# Patient Record
Sex: MALE | Race: WHITE | NOT HISPANIC OR LATINO | Employment: FULL TIME | URBAN - METROPOLITAN AREA
[De-identification: names, ages, dates, MRNs, and addresses within clinical notes are randomized per-mention and may not be internally consistent; named-entity substitution may affect disease eponyms.]

---

## 2019-07-10 ENCOUNTER — TRANSFERRED RECORDS (OUTPATIENT)
Dept: HEALTH INFORMATION MANAGEMENT | Facility: CLINIC | Age: 39
End: 2019-07-10

## 2020-05-28 ENCOUNTER — OFFICE VISIT (OUTPATIENT)
Dept: FAMILY MEDICINE | Facility: CLINIC | Age: 40
End: 2020-05-28
Payer: COMMERCIAL

## 2020-05-28 ENCOUNTER — NURSE TRIAGE (OUTPATIENT)
Dept: FAMILY MEDICINE | Facility: CLINIC | Age: 40
End: 2020-05-28

## 2020-05-28 VITALS
DIASTOLIC BLOOD PRESSURE: 70 MMHG | TEMPERATURE: 98.3 F | HEIGHT: 74 IN | HEART RATE: 94 BPM | WEIGHT: 204 LBS | SYSTOLIC BLOOD PRESSURE: 138 MMHG | BODY MASS INDEX: 26.18 KG/M2 | OXYGEN SATURATION: 98 %

## 2020-05-28 DIAGNOSIS — R10.9 ABDOMINAL DISCOMFORT: Primary | ICD-10-CM

## 2020-05-28 DIAGNOSIS — K92.89 GAS BLOAT SYNDROME: ICD-10-CM

## 2020-05-28 LAB
ALBUMIN UR-MCNC: NEGATIVE MG/DL
APPEARANCE UR: CLEAR
BILIRUB UR QL STRIP: NEGATIVE
COLOR UR AUTO: YELLOW
ERYTHROCYTE [DISTWIDTH] IN BLOOD BY AUTOMATED COUNT: 12.6 % (ref 10–15)
GLUCOSE UR STRIP-MCNC: NEGATIVE MG/DL
HCT VFR BLD AUTO: 46.2 % (ref 40–53)
HGB BLD-MCNC: 15.8 G/DL (ref 13.3–17.7)
HGB UR QL STRIP: ABNORMAL
KETONES UR STRIP-MCNC: 40 MG/DL
LEUKOCYTE ESTERASE UR QL STRIP: NEGATIVE
MCH RBC QN AUTO: 28.9 PG (ref 26.5–33)
MCHC RBC AUTO-ENTMCNC: 34.2 G/DL (ref 31.5–36.5)
MCV RBC AUTO: 85 FL (ref 78–100)
NITRATE UR QL: NEGATIVE
PH UR STRIP: 6.5 PH (ref 5–7)
PLATELET # BLD AUTO: 227 10E9/L (ref 150–450)
RBC # BLD AUTO: 5.47 10E12/L (ref 4.4–5.9)
RBC #/AREA URNS AUTO: ABNORMAL /HPF
SOURCE: ABNORMAL
SP GR UR STRIP: 1.02 (ref 1–1.03)
UROBILINOGEN UR STRIP-ACNC: 0.2 EU/DL (ref 0.2–1)
WBC # BLD AUTO: 7.9 10E9/L (ref 4–11)
WBC #/AREA URNS AUTO: ABNORMAL /HPF

## 2020-05-28 PROCEDURE — 36415 COLL VENOUS BLD VENIPUNCTURE: CPT | Performed by: FAMILY MEDICINE

## 2020-05-28 PROCEDURE — 99203 OFFICE O/P NEW LOW 30 MIN: CPT | Performed by: FAMILY MEDICINE

## 2020-05-28 PROCEDURE — 81001 URINALYSIS AUTO W/SCOPE: CPT | Performed by: FAMILY MEDICINE

## 2020-05-28 PROCEDURE — 80053 COMPREHEN METABOLIC PANEL: CPT | Performed by: FAMILY MEDICINE

## 2020-05-28 PROCEDURE — 85027 COMPLETE CBC AUTOMATED: CPT | Performed by: FAMILY MEDICINE

## 2020-05-28 ASSESSMENT — MIFFLIN-ST. JEOR: SCORE: 1900.09

## 2020-05-28 NOTE — PROGRESS NOTES
Subjective     Jitendra Flannery is a 39 year old male who presents to clinic today for the following health issues:    HPI   ABDOMINAL   PAIN     Onset: x 2-3 weeks , OFF AND ON , comes and goes not much today     Description:   Character: Sharp, Dull ache and Cramping  Location: left upper quadrant left lower quadrant  Radiation: sometimes goes to upper mid abdomen     Intensity: mild    Progression of Symptoms:  same and intermittent, feeling better today not much pain today     Accompanying Signs & Symptoms:  Fever/Chills?: no   Gas/Bloating: YES, indigestion feeling , more gassy   Eating same. But has moved to [place] recently so has been eating less vegetable   He bikes regualry could do better with diet and may eed to reduce fat in diet  No associated chest pain, sob dizziness etc.    Nausea: no   Vomitting: no   Diarrhea?: no   Constipation:no , usual no change in stool may be somewhat soft , but no blood in stool   Dysuria or Hematuria: no frequent urination , normal urine   Dry mouth lately      History:   Trauma: no   Previous similar pain: yes  Previous tests done: x-ray    Precipitating factors:   Does the pain change with:     Food: YES -worse    BM: YES- better     Urination: n/a    Alleviating factors:  Has lot of gas, Farting and bm makes it better     Therapies Tried and outcome: none    LMP:  not applicable             Reviewed and updated as needed this visit by Provider         Review of Systems   Constitutional, HEENT, cardiovascular, pulmonary, GI, , musculoskeletal, neuro, skin, endocrine and psych systems are negative, except as otherwise noted.      Objective    There were no vitals taken for this visit.  There is no height or weight on file to calculate BMI.  Physical Exam   GENERAL: healthy, alert and no distress  EYES: Eyes grossly normal to inspection,  conjunctivae and sclerae normal  HENT: oropharynx clear and oral mucous membranes moist  NECK: no adenopathy, no asymmetry, masses, or scars  and thyroid normal to palpation  RESP: lungs clear to auscultation - no rales, rhonchi or wheezes  CV: regular rate and rhythm, normal S1 S2, no S3 or S4, no murmur, click or rub, no peripheral edema and peripheral pulses strong  ABDOMEN: soft, nontender, no hepatosplenomegaly, no masses and bowel sounds normal  MS: no edema  SKIN: no suspicious lesions or rashes  NEURO: Normal strength and tone, mentation intact and speech normal  PSYCH: mentation appears normal, affect normal/bright    Diagnostic Test Results:  Labs reviewed in Epic  CBC - normal   Urinalysis - unremarkable        Assessment & Plan     (R10.9) Abdominal discomfort  (primary encounter diagnosis)  Comment: pain is better today but still has gas mostly   Plan: CBC with platelets, Comprehensive metabolic         panel, *UA reflex to Microscopic and Culture         ([facility] (except [place]        and [place]), Urine Microscopic            (K92.89) Gas bloat syndrome  Comment:   Plan:      Discussed possible differential diagnosis for his  Symptoms.  cbc normal. Awaiting other labs   clinically he is better and comfortable watching .   He admits to lot of gas and we talked about diet/ fibre and probiotic to regulate bm and avoid gas producing food etc to see if helps   Talked about warning s/s for which should be seen urgently     Awaiting other labs   .Cares and  treatment discussed. follow up if problem   Patient expressed understanding and agreement with treatment plan. All patient's questions were answered, will let me know if has more later.  Medications: Rx's: Reviewed the potential side effects/complications of medications prescribed.             Sola Flynn MD  [facility]

## 2020-05-28 NOTE — PATIENT INSTRUCTIONS
Patient Education     Abdominal Pain    Abdominal pain is pain in the stomach or belly area. Everyone has this pain from time to time. In many cases it goes away on its own. But abdominal pain can sometimes be due to a serious problem, such as appendicitis. So it s important to know when to get help.  Causes of abdominal pain  There are many possible causes of abdominal pain. Common causes in adults include:    Constipation, diarrhea, or gas    Stomach acid flowing back up into the esophagus (acid reflux or heartburn)    Severe acid reflux, called GERD (gastroesophageal reflux disease)    A sore in the lining of the stomach or small intestine (peptic ulcer)    Inflammation of the gallbladder, liver, or pancreas    Gallstones or kidney stones    Appendicitis     Intestinal blockage     An internal organ pushing through a muscle or other tissue (hernia)    Urinary tract infections    In women, menstrual cramps, fibroids, ovarian cysts, pelvic inflammatory disease, or endometriosis    Inflammation or infection of the intestines, including Crohn's disease and ulcerative colitis    Irritable bowel syndrome  Diagnosing the cause of abdominal pain  Your healthcare provider will give you a physical exam help find the cause of your pain. If needed, you will have tests. Belly pain has many possible causes. So it can be hard to find the reason for your pain. Giving details about your pain can help. Tell your provider where and when you feel the pain, and what makes it better or worse. Also let your provider know if you have other symptoms such as:    Fever    Tiredness    Upset stomach (nausea)    Vomiting    Changes in bathroom habits    Blood in the stool or black, tarry stool    Weight loss that you can't explain (involuntary weight loss?)  Also report any family history of stomach or intestinal problems, or cancers. Tell your provider about all your alcohol use and drug use. Tell your provider about all medicines you  use, including herbs, vitamins, and supplements.   Treating abdominal pain  Some causes of pain need emergency medical treatment right away. These include appendicitis or a bowel blockage. Other problems can be treated with rest, fluids, or medicines. Your healthcare provider can give you specific instructions for treatment or self-care based on what is causing your pain.     If you have vomiting or diarrhea, sip water or other clear fluids. When you are ready to eat solid foods again, start with small amounts of easy-to-digest, low-fat foods. These include apple sauce, toast, or crackers.  When to get medical care  Call 911 or go to the hospital right away if you:    Can t pass stool and are vomiting    Are vomiting blood or have bloody diarrhea or black, tarry diarrhea    Have chest, neck, or shoulder pain    Feel like you might pass out    Have pain in your shoulder blades with nausea    Have sudden, severe belly pain    Have new, severe pain unlike any you have felt before    Have a belly that is rigid, hard, and hurts to touch  Call your healthcare provider if you have:    Pain for more than 5 days    Bloating for more than 2 days    Diarrhea for more than 5 days    A fever of 100.4 F (38 C) or higher, or as directed by your healthcare provider    Pain that gets worse    Weight loss for no reason    Continued lack of appetite    Blood in your stool  How to prevent abdominal pain  Here are some tips to help prevent abdominal pain:    Eat smaller amounts of food at each meal.    Don't eat greasy, fried, or other high-fat foods.    Don't eat foods that give you gas.    Exercise regularly.    Drink plenty of fluids.  To help prevent GERD symptoms:    Quit smoking.    Reduce alcohol and foods that increase stomach acid.    Don't use aspirin or over-the-counter pain and fever medicines, if possible. This includes nonsteroidal anti-inflammatory drugs (NSAIDs).    Lose excess weight.    Finish eating at least 2 hours  before you go to bed or lie down.    Raise the head of your bed.  Date Last Reviewed: 7/1/2016 2000-2019 [employer]. [address] [place] [address], [place] [address]. All rights reserved. This information is not intended as a substitute for professional medical care. Always follow your healthcare professional's instructions.

## 2020-05-28 NOTE — TELEPHONE ENCOUNTER
"    Additional Information    Negative: Passed out (i.e., fainted, collapsed and was not responding)    Negative: Shock suspected (e.g., cold/pale/clammy skin, too weak to stand, low BP, rapid pulse)    Negative: Sounds like a life-threatening emergency to the triager    Negative: Chest pain    Negative: Pain is mainly in upper abdomen (if needed ask: 'is it mainly above the belly button?')    Negative: SEVERE abdominal pain (e.g., excruciating)    Negative: Vomiting red blood or black (coffee ground) material    Negative: Bloody, black, or tarry bowel movements    Negative: Unable to urinate (or only a few drops) and bladder feels very full    Negative: Pain in scrotum persists > 1 hour    Negative: Constant abdominal pain lasting > 2 hours    Negative: Vomiting bile (green color)    Negative: Patient sounds very sick or weak to the triager    Negative: Vomiting and abdomen looks much more swollen than usual    Negative: White of the eyes have turned yellow (i.e., jaundice)    Negative: Blood in urine (red, pink, or tea-colored)    Negative: Fever > 103 F (39.4 C)    Negative: Fever > 101 F (38.3 C) and over 60 years of age    Negative: Fever > 100.0 F (37.8 C) and has diabetes mellitus or a weak immune system (e.g., HIV positive, cancer chemotherapy, organ transplant, splenectomy, chronic steroids)    Negative: Fever > 100.0 F (37.8 C) and bedridden (e.g., nursing home patient, stroke, chronic illness, recovering from surgery)    Negative: Age > 60 years    Negative: Patient wants to be seen    Mild pain that comes and goes (cramps) lasts > 24 hours    Answer Assessment - Initial Assessment Questions  1. LOCATION: \"Where does it hurt?\"       Lower left abdomen  2. RADIATION: \"Does the pain shoot anywhere else?\" (e.g., chest, back)      Travels down the left side to the right under the belly button  3. ONSET: \"When did the pain begin?\" (Minutes, hours or days ago)       Couple weeks ago  4. SUDDEN: \"Gradual or " "sudden onset?\"      On and off for a couple of weeks  5. PATTERN \"Does the pain come and go, or is it constant?\"     - If constant: \"Is it getting better, staying the same, or worsening?\"       (Note: Constant means the pain never goes away completely; most serious pain is constant and it progresses)      - If intermittent: \"How long does it last?\" \"Do you have pain now?\"      (Note: Intermittent means the pain goes away completely between bouts)      Comes and goes  6. SEVERITY: \"How bad is the pain?\"  (e.g., Scale 1-10; mild, moderate, or severe)     - MILD (1-3): doesn't interfere with normal activities, abdomen soft and not tender to touch      - MODERATE (4-7): interferes with normal activities or awakens from sleep, tender to touch      - SEVERE (8-10): excruciating pain, doubled over, unable to do any normal activities        2-3/10  7. RECURRENT SYMPTOM: \"Have you ever had this type of abdominal pain before?\" If so, ask: \"When was the last time?\" and \"What happened that time?\"       Yes,  A couple of years ago and had more stool in colon  8. CAUSE: \"What do you think is causing the abdominal pain?\"      Gas  9. RELIEVING/AGGRAVATING FACTORS: \"What makes it better or worse?\" (e.g., movement, antacids, bowel movement)      Burping and passing gas makes it better.    10. OTHER SYMPTOMS: \"Has there been any vomiting, diarrhea, constipation, or urine problems?\"        no    Protocols used: ABDOMINAL PAIN - MALE-A-OH    SEE TODAY IN OFFICE:   * You need to be examined today. Let me give you an appointment.  * IF NO AVAILABLE APPOINTMENTS: You need to be seen in the Urgent Care Center.  Go there today. A nearby Urgent Care Center is often a good source of care. Another choice is to go to the ER.      CALL BACK IF:  * Abdominal pain is constant and present for more than 2 hours  * Abdominal pains come and go and are present for more than 24 hours  * You become worse      Patient/Caregiver understands and will follow " care advice? Yes, plans to follow advice        Transferred to  to schedule and finish with registration    Tabby FUCHS RN  EP Triage

## 2020-05-29 LAB
ALBUMIN SERPL-MCNC: 4.4 G/DL (ref 3.4–5)
ALP SERPL-CCNC: 53 U/L (ref 40–150)
ALT SERPL W P-5'-P-CCNC: 25 U/L (ref 0–70)
ANION GAP SERPL CALCULATED.3IONS-SCNC: 6 MMOL/L (ref 3–14)
AST SERPL W P-5'-P-CCNC: 25 U/L (ref 0–45)
BILIRUB SERPL-MCNC: 0.7 MG/DL (ref 0.2–1.3)
BUN SERPL-MCNC: 11 MG/DL (ref 7–30)
CALCIUM SERPL-MCNC: 9.5 MG/DL (ref 8.5–10.1)
CHLORIDE SERPL-SCNC: 107 MMOL/L (ref 94–109)
CO2 SERPL-SCNC: 25 MMOL/L (ref 20–32)
CREAT SERPL-MCNC: 0.99 MG/DL (ref 0.66–1.25)
GFR SERPL CREATININE-BSD FRML MDRD: >90 ML/MIN/{1.73_M2}
GLUCOSE SERPL-MCNC: 96 MG/DL (ref 70–99)
POTASSIUM SERPL-SCNC: 4 MMOL/L (ref 3.4–5.3)
PROT SERPL-MCNC: 8 G/DL (ref 6.8–8.8)
SODIUM SERPL-SCNC: 138 MMOL/L (ref 133–144)

## 2021-06-25 NOTE — TELEPHONE ENCOUNTER
Action    Action Taken 6-25: requested records from [facility] in [place] Fax# [phone]  8-12: sent second request to [facility]

## 2021-08-27 ENCOUNTER — LAB (OUTPATIENT)
Dept: LAB | Facility: CLINIC | Age: 41
End: 2021-08-27
Payer: COMMERCIAL

## 2021-08-27 ENCOUNTER — OFFICE VISIT (OUTPATIENT)
Dept: CARDIOLOGY | Facility: CLINIC | Age: 41
End: 2021-08-27
Attending: INTERNAL MEDICINE
Payer: COMMERCIAL

## 2021-08-27 ENCOUNTER — PRE VISIT (OUTPATIENT)
Dept: CARDIOLOGY | Facility: CLINIC | Age: 41
End: 2021-08-27

## 2021-08-27 VITALS
HEART RATE: 62 BPM | BODY MASS INDEX: 27.34 KG/M2 | WEIGHT: 213 LBS | HEIGHT: 74 IN | DIASTOLIC BLOOD PRESSURE: 79 MMHG | OXYGEN SATURATION: 98 % | SYSTOLIC BLOOD PRESSURE: 143 MMHG

## 2021-08-27 DIAGNOSIS — Z82.49 FAMILY HISTORY OF HEART ATTACK: ICD-10-CM

## 2021-08-27 DIAGNOSIS — Z82.49 FAMILY HISTORY OF PREMATURE CAD: ICD-10-CM

## 2021-08-27 DIAGNOSIS — R00.2 PALPITATIONS: ICD-10-CM

## 2021-08-27 DIAGNOSIS — Z82.49 FAMILY HISTORY OF PREMATURE CAD: Primary | ICD-10-CM

## 2021-08-27 LAB
ALBUMIN SERPL-MCNC: 4.3 G/DL (ref 3.4–5)
ALP SERPL-CCNC: 55 U/L (ref 40–150)
ALT SERPL W P-5'-P-CCNC: 23 U/L (ref 0–70)
ANION GAP SERPL CALCULATED.3IONS-SCNC: 3 MMOL/L (ref 3–14)
AST SERPL W P-5'-P-CCNC: 23 U/L (ref 0–45)
BILIRUB SERPL-MCNC: 0.5 MG/DL (ref 0.2–1.3)
BUN SERPL-MCNC: 9 MG/DL (ref 7–30)
CALCIUM SERPL-MCNC: 9.7 MG/DL (ref 8.5–10.1)
CHLORIDE BLD-SCNC: 107 MMOL/L (ref 94–109)
CHOLEST SERPL-MCNC: 198 MG/DL
CO2 SERPL-SCNC: 30 MMOL/L (ref 20–32)
CREAT SERPL-MCNC: 1.12 MG/DL (ref 0.66–1.25)
ERYTHROCYTE [DISTWIDTH] IN BLOOD BY AUTOMATED COUNT: 12.1 % (ref 10–15)
FASTING STATUS PATIENT QL REPORTED: ABNORMAL
GFR SERPL CREATININE-BSD FRML MDRD: 81 ML/MIN/1.73M2
GLUCOSE BLD-MCNC: 92 MG/DL (ref 70–99)
HBA1C MFR BLD: 5.2 % (ref 0–5.6)
HCT VFR BLD AUTO: 46.9 % (ref 40–53)
HDLC SERPL-MCNC: 54 MG/DL
HGB BLD-MCNC: 15.5 G/DL (ref 13.3–17.7)
LDLC SERPL CALC-MCNC: 126 MG/DL
MCH RBC QN AUTO: 28.5 PG (ref 26.5–33)
MCHC RBC AUTO-ENTMCNC: 33 G/DL (ref 31.5–36.5)
MCV RBC AUTO: 86 FL (ref 78–100)
NONHDLC SERPL-MCNC: 144 MG/DL
PLATELET # BLD AUTO: 235 10E3/UL (ref 150–450)
POTASSIUM BLD-SCNC: 4.3 MMOL/L (ref 3.4–5.3)
PROT SERPL-MCNC: 7.6 G/DL (ref 6.8–8.8)
RBC # BLD AUTO: 5.43 10E6/UL (ref 4.4–5.9)
SODIUM SERPL-SCNC: 140 MMOL/L (ref 133–144)
TRIGL SERPL-MCNC: 88 MG/DL
WBC # BLD AUTO: 5.2 10E3/UL (ref 4–11)

## 2021-08-27 PROCEDURE — 99205 OFFICE O/P NEW HI 60 MIN: CPT | Mod: GC | Performed by: INTERNAL MEDICINE

## 2021-08-27 PROCEDURE — 83036 HEMOGLOBIN GLYCOSYLATED A1C: CPT | Performed by: PATHOLOGY

## 2021-08-27 PROCEDURE — 85027 COMPLETE CBC AUTOMATED: CPT | Performed by: PATHOLOGY

## 2021-08-27 PROCEDURE — G0463 HOSPITAL OUTPT CLINIC VISIT: HCPCS | Mod: 25

## 2021-08-27 PROCEDURE — 83695 ASSAY OF LIPOPROTEIN(A): CPT | Mod: 90 | Performed by: PATHOLOGY

## 2021-08-27 PROCEDURE — 80053 COMPREHEN METABOLIC PANEL: CPT | Performed by: PATHOLOGY

## 2021-08-27 PROCEDURE — 93005 ELECTROCARDIOGRAM TRACING: CPT

## 2021-08-27 PROCEDURE — 80061 LIPID PANEL: CPT | Performed by: PATHOLOGY

## 2021-08-27 PROCEDURE — 36415 COLL VENOUS BLD VENIPUNCTURE: CPT | Performed by: PATHOLOGY

## 2021-08-27 ASSESSMENT — MIFFLIN-ST. JEOR: SCORE: 1935.91

## 2021-08-27 ASSESSMENT — PAIN SCALES - GENERAL: PAINLEVEL: NO PAIN (0)

## 2021-08-27 NOTE — NURSING NOTE
Chief Complaint   Patient presents with     New Patient     New patient      Vitals were taken and medications were reconciled. EKG was performed   KENJI Camejo  10:19 AM

## 2021-08-27 NOTE — LETTER
8/27/2021      RE: Jitendra Flannery  [address] [address]  [place] [address]       Dear Colleague,    Thank you for the opportunity to participate in the care of your patient, Jitendra Flannery, at the [facility] [place] [facility]. Please see a copy of my visit note below.    CARDIOLOGY Prevention Note    HPI:  Jitendra Flannery is a 40 year old male who receives primary care from Sola Flynn MD. Jitendra was self referred to Cardiology for discussion of risk factor modification in the setting of significant family history.     Jitendra is a pleasant male with no significant past medical history. CVD risk factors include (-) HTN, (-) Smoking, (+) family history , (-) DM.     Briefly Jitendra is a 39 YO  at the [employer].  He is very active with cycling, [employer].  He presents today in the setting of discussion of cardiovascular risk modification and assessment following his brother's unexpected death earlier this year.    Patient reports that his brother passed away roughly in January after a cardiac arrest at home at the age of 50.  An autopsy was performed and showed significant coronary artery disease along with some cardiomegaly. His brother was previously healthy and quite active. Per Stewart he also reports his brother had developed atrial fibrillation prior and had a evaluation at [facility] in [place]. Other family history includes a grandfather who had an MI at age 65. His mother had a diagnosis of pulmonary hypertension in her 60s or 70s of unclear etiology.    Jitendra is without any significant symptoms today.  He has no shortness of breath or chest pain.  He has occasional skipped heartbeats which he says have been chronic.  He previously underwent a preliminary evaluation in college when he had chest pain and some skipped beats including EKG.  He has no chest pain with exertion although he does  notice some heaviness with extreme exertion.  He has not fainted or passed out.  He has never had palpitations with exercise.    Diet: Vegetable with lean meats, no take out or processed food.  No significant alcohol usage. No sugary beverages  Physical activity: Biking, cycling, cross fit    EKG in clinic shows sinus bradycardia with Q waves in V1 and possibly V2    ASSESSMENT AND PLAN  Jitendra Flannery is a 40 year old male presenting for discussion regarding cardiovascular risk assessment and risk modification. He overall is asymptomatic from a CV standpoint. EKG in clinic shows possible septal infarct although the QRS in V2 has a very slightly positive initial deflection. He has no concerning ischemic findings or evidence of rhythm disturbance. In the setting of his significant family hx we will proceed with an evaluation to rule out any structural abnormalities, obtain a CAC score to assess for coronary calcium and obtain blood work for HLD.      We discussed with Stewart the importance of continued exercise and healthy lifestyle choices including avoiding processed food and high sodium intake. Stewart is interested in possible referral to [facility].    Recommendations  1. CBC, CMP, Lipid panel, Lipoprotein A  2. TTE, CAC score  3. Further determination on continued diagnostic eval for coronary disease or other CVD pending initial testing. Will discuss results with Stewart.  4. Close monitoring for any palpitations or rhythm changes. If this occurs will perform Zio monitoring   5. Provided information for the [facility]  6. Follow-up in 3 months to discuss test results    Lab results from 8/27/21 show mildly elevated LDL  Normal results include Lpa, a1c, cmp and cbc    Thank you for the opportunity to participate in the care of Jitendra. Please feel free to contact me with any additional questions or concerns.    This patient was discussed with Dr. John Jiang who agrees with the plan    Flo Neely,  "MD  Cardiology Fellow PGY 5    Kartik Jiang MD    Preventive Cardiology  Pager: [phone]    Attending: Patient seen and examined with Dr. Gomez. The history and physical findings are accurate as recorded. My additional findings, if any, have been incorporated into the body of the note. All labs, imaging studies and ECG data have been reviewed personally. The assessment and plan outlined reflect our joint decision making.     The total time of this encounter amounted to 64 minutes. This time included time spent with the patient, reviewing records, ordering tests, and performing post visit documentation.     PAST MEDICAL HISTORY:  There is no problem list on file for this patient.    Past Medical History:   Diagnosis Date     NO ACTIVE PROBLEMS        CURRENT MEDICATIONS:  No current outpatient medications on file.       PAST SURGICAL HISTORY:  Past Surgical History:   Procedure Laterality Date     KNEE SURGERY      rt knee x for ACL REPAIER  ,         ALLERGIES  Patient has no known allergies.    FAMILY HX:  Family History   Problem Relation Age of Onset     Cirrhosis Mother          AT AGE 73, FROM COMPLICATION      Pulmonary Hypertension Mother      Coronary Artery Disease Maternal Grandfather 65     Myocardial Infarction Maternal Grandfather 65     Diabetes No family hx of      Cerebrovascular Disease No family hx of        SOCIAL HX:  Social History     Tobacco Use     Smoking status: Never Smoker     Smokeless tobacco: Never Used   Substance Use Topics     Alcohol use: Yes     Drug use: Never        ROS:  Comprehensive ROS is negative except as noted in HPI.    VITAL SIGNS:  BP (!) 143/79 (BP Location: Left arm, Patient Position: Sitting, Cuff Size: Adult Regular)   Pulse 62   Ht 1.88 m (6' 2\")   Wt 96.6 kg (213 lb)   SpO2 98%   BMI 27.35 kg/m    Body mass index is 27.35 kg/m .  Wt Readings from Last 2 Encounters:   21 96.6 kg (213 lb)   20 92.5 kg (204 " lb)       PHYSICAL EXAM  Gen: pleasant male sitting comfortably in NAD  Head: nc/at  Eyes: EOMI,   Neck: supple, no lymphadenopathy  CV: nml s1/s2, no murmur or gallop; no JVD  Chest: clear lung sounds bilaterally   Abd: Non distended   Ext: warm, no LE edema  Skin: no rash on limited exam  Neuro: awake, alert, oriented, nml speech and gait  Vasc: 2+ bilateral carotid, radial     LABS: personally reviewed  CMP  Recent Labs   Lab Test 08/27/21  1232 05/28/20  1435    138   POTASSIUM 4.3 4.0   CHLORIDE 107 107   CO2 30 25   ANIONGAP 3 6   GLC 92 96   BUN 9 11   CR 1.12 0.99   GFRESTIMATED 81 >90   GFRESTBLACK  --  >90   JONI 9.7 9.5   PROTTOTAL 7.6 8.0   ALBUMIN 4.3 4.4   BILITOTAL 0.5 0.7   ALKPHOS 55 53   AST 23 25   ALT 23 25     CBC  Recent Labs   Lab Test 08/27/21  1232 05/28/20  1435   WBC 5.2 7.9   RBC 5.43 5.47   HGB 15.5 15.8   HCT 46.9 46.2   MCV 86 85   MCH 28.5 28.9   MCHC 33.0 34.2   RDW 12.1 12.6    227     INRNo lab results found.  Recent Labs   Lab Test 08/27/21  1232   CHOL 198   HDL 54   *   TRIG 88     Recent Labs   Lab Test 08/27/21  1232   A1C 5.2       Most recent EKG: reviewed and discussed with shruti : Sinus bradycardia with 1st degree AV block. Q wave in V1    Most recent ECHO: N/A     Most recent STRESS TEST:  N/A     Most recent CARDIAC CATH:  N/A     Other Imaging: N/A       Please do not hesitate to contact me if you have any questions/concerns.     Sincerely,     Kartik Jiang MD

## 2021-08-27 NOTE — PATIENT INSTRUCTIONS
"You were seen today in the Cardiovascular Clinic at the [facility].     Cardiology Providers you saw during your visit: Dr. John Jiang     Diagnosis:   Encounter Diagnoses   Name Primary?     Family history of heart attack      Family history of premature CAD Yes     Palpitations           Orders:   Orders Placed This Encounter   Procedures     Lipid panel reflex to direct LDL Fasting     Hemoglobin A1c     Comprehensive metabolic panel     Lipoprotein (a)     CBC with platelets     [facility] CARDIOLOGY REFERRAL     Follow-Up with Cardiologist     EKG 12-lead, tracing only (Same Day)     Echocardiogram Complete       Current Medication List  No current outpatient medications on file.         Medications Discontinued:  There are no discontinued medications.      Recommendations:   1. Get your labs checked when you are able.  2. Schedule an echocardiogram (heart ultrasound) whenever it is convenient.   3. Schedule a coronary artery calcium scan whenever it is convenient.  4. Look into the [facility] for further evaluation for signs of premature cardiovascular disease.  5. Follow up with Dr. John Jiang in 3 months or after all the testing is complete.        Please feel free to call me with any questions or concerns.       Amrit Heredia LPN     Questions: [phone].   First press #1 for the [employer] and then press #4 for \"Medical Questions\" to reach us Cardiology Nurses.     [phone].   First press #1 for the [employer] and then press #1     On Call Cardiologist for after hours or on weekends: [phone]   option #4 and ask to speak to the on-call Cardiologist.          If you need a medication refill please contact your pharmacy.  Please allow 3 business days for your refill to be completed.  ________________________________________________________________________________________________________________________________    "

## 2021-08-27 NOTE — PROGRESS NOTES
CARDIOLOGY Prevention Note    HPI:  Jitendra Flannery is a 40 year old male who receives primary care from Sola Flynn MD. Jitendra was self referred to Cardiology for discussion of risk factor modification in the setting of significant family history.     Jitendra is a pleasant male with no significant past medical history. CVD risk factors include (-) HTN, (-) Smoking, (+) family history , (-) DM.     Briefly Jitendra is a 41 YO  at the [employer].  He is very active with cycling, [employer].  He presents today in the setting of discussion of cardiovascular risk modification and assessment following his brother's unexpected death earlier this year.    Patient reports that his brother passed away roughly in January after a cardiac arrest at home at the age of 50.  An autopsy was performed and showed significant coronary artery disease along with some cardiomegaly. His brother was previously healthy and quite active. Per Stewart he also reports his brother had developed atrial fibrillation prior and had a evaluation at [facility] in [place]. Other family history includes a grandfather who had an MI at age 65. His mother had a diagnosis of pulmonary hypertension in her 60s or 70s of unclear etiology.    Jitendra is without any significant symptoms today.  He has no shortness of breath or chest pain.  He has occasional skipped heartbeats which he says have been chronic.  He previously underwent a preliminary evaluation in college when he had chest pain and some skipped beats including EKG.  He has no chest pain with exertion although he does notice some heaviness with extreme exertion.  He has not fainted or passed out.  He has never had palpitations with exercise.    Diet: Vegetable with lean meats, no take out or processed food.  No significant alcohol usage. No sugary beverages  Physical activity: Biking, cycling, cross fit    EKG in clinic shows sinus bradycardia with Q waves in  V1 and possibly V2    ASSESSMENT AND PLAN  Jitendra Flannery is a 40 year old male presenting for discussion regarding cardiovascular risk assessment and risk modification. He overall is asymptomatic from a CV standpoint. EKG in clinic shows possible septal infarct although the QRS in V2 has a very slightly positive initial deflection. He has no concerning ischemic findings or evidence of rhythm disturbance. In the setting of his significant family hx we will proceed with an evaluation to rule out any structural abnormalities, obtain a CAC score to assess for coronary calcium and obtain blood work for HLD.      We discussed with Stewart the importance of continued exercise and healthy lifestyle choices including avoiding processed food and high sodium intake. Stewart is interested in possible referral to [facility].    Recommendations  1. CBC, CMP, Lipid panel, Lipoprotein A  2. TTE, CAC score  3. Further determination on continued diagnostic eval for coronary disease or other CVD pending initial testing. Will discuss results with Stewart.  4. Close monitoring for any palpitations or rhythm changes. If this occurs will perform Zio monitoring   5. Provided information for the [facility]  6. Follow-up in 3 months to discuss test results    Lab results from 8/27/21 show mildly elevated LDL  Normal results include Lpa, a1c, cmp and cbc    Thank you for the opportunity to participate in the care of Jitendra. Please feel free to contact me with any additional questions or concerns.    This patient was discussed with Dr. John Jiang who agrees with the plan    Flo Neely MD  Cardiology Fellow PGY 5    Kartik Jiang MD    Preventive Cardiology  Pager: [phone]    Attending: Patient seen and examined with Dr. Gomez. The history and physical findings are accurate as recorded. My additional findings, if any, have been incorporated into the body of the note. All labs, imaging studies and ECG data  "have been reviewed personally. The assessment and plan outlined reflect our joint decision making.     The total time of this encounter amounted to 64 minutes. This time included time spent with the patient, reviewing records, ordering tests, and performing post visit documentation.     PAST MEDICAL HISTORY:  There is no problem list on file for this patient.    Past Medical History:   Diagnosis Date     NO ACTIVE PROBLEMS        CURRENT MEDICATIONS:  No current outpatient medications on file.       PAST SURGICAL HISTORY:  Past Surgical History:   Procedure Laterality Date     KNEE SURGERY      rt knee x for ACL REPAIER  ,         ALLERGIES  Patient has no known allergies.    FAMILY HX:  Family History   Problem Relation Age of Onset     Cirrhosis Mother          AT AGE 73, FROM COMPLICATION      Pulmonary Hypertension Mother      Coronary Artery Disease Maternal Grandfather 65     Myocardial Infarction Maternal Grandfather 65     Diabetes No family hx of      Cerebrovascular Disease No family hx of        SOCIAL HX:  Social History     Tobacco Use     Smoking status: Never Smoker     Smokeless tobacco: Never Used   Substance Use Topics     Alcohol use: Yes     Drug use: Never        ROS:  Comprehensive ROS is negative except as noted in HPI.    VITAL SIGNS:  BP (!) 143/79 (BP Location: Left arm, Patient Position: Sitting, Cuff Size: Adult Regular)   Pulse 62   Ht 1.88 m (6' 2\")   Wt 96.6 kg (213 lb)   SpO2 98%   BMI 27.35 kg/m    Body mass index is 27.35 kg/m .  Wt Readings from Last 2 Encounters:   21 96.6 kg (213 lb)   20 92.5 kg (204 lb)       PHYSICAL EXAM  Gen: pleasant male sitting comfortably in NAD  Head: nc/at  Eyes: EOMI,   Neck: supple, no lymphadenopathy  CV: nml s1/s2, no murmur or gallop; no JVD  Chest: clear lung sounds bilaterally   Abd: Non distended   Ext: warm, no LE edema  Skin: no rash on limited exam  Neuro: awake, alert, oriented, nml speech and gait  Vasc: 2+ " bilateral carotid, radial     LABS: personally reviewed  CMP  Recent Labs   Lab Test 08/27/21  1232 05/28/20  1435    138   POTASSIUM 4.3 4.0   CHLORIDE 107 107   CO2 30 25   ANIONGAP 3 6   GLC 92 96   BUN 9 11   CR 1.12 0.99   GFRESTIMATED 81 >90   GFRESTBLACK  --  >90   JONI 9.7 9.5   PROTTOTAL 7.6 8.0   ALBUMIN 4.3 4.4   BILITOTAL 0.5 0.7   ALKPHOS 55 53   AST 23 25   ALT 23 25     CBC  Recent Labs   Lab Test 08/27/21  1232 05/28/20  1435   WBC 5.2 7.9   RBC 5.43 5.47   HGB 15.5 15.8   HCT 46.9 46.2   MCV 86 85   MCH 28.5 28.9   MCHC 33.0 34.2   RDW 12.1 12.6    227     INRNo lab results found.  Recent Labs   Lab Test 08/27/21  1232   CHOL 198   HDL 54   *   TRIG 88     Recent Labs   Lab Test 08/27/21  1232   A1C 5.2       Most recent EKG: reviewed and discussed with shruti : Sinus bradycardia with 1st degree AV block. Q wave in V1    Most recent ECHO: N/A     Most recent STRESS TEST:  N/A     Most recent CARDIAC CATH:  N/A     Other Imaging: N/A

## 2021-08-28 LAB
ATRIAL RATE - MUSE: 59 BPM
DIASTOLIC BLOOD PRESSURE - MUSE: NORMAL MMHG
INTERPRETATION ECG - MUSE: NORMAL
P AXIS - MUSE: 60 DEGREES
PR INTERVAL - MUSE: 222 MS
QRS DURATION - MUSE: 82 MS
QT - MUSE: 380 MS
QTC - MUSE: 376 MS
R AXIS - MUSE: 55 DEGREES
SYSTOLIC BLOOD PRESSURE - MUSE: NORMAL MMHG
T AXIS - MUSE: 52 DEGREES
VENTRICULAR RATE- MUSE: 59 BPM

## 2021-08-29 LAB — LPA SERPL-MCNC: 6 MG/DL

## 2021-09-04 ENCOUNTER — HEALTH MAINTENANCE LETTER (OUTPATIENT)
Age: 41
End: 2021-09-04

## 2021-09-10 ENCOUNTER — HOSPITAL ENCOUNTER (OUTPATIENT)
Dept: CARDIOLOGY | Facility: CLINIC | Age: 41
End: 2021-09-10
Attending: INTERNAL MEDICINE
Payer: COMMERCIAL

## 2021-09-10 ENCOUNTER — HOSPITAL ENCOUNTER (OUTPATIENT)
Dept: CT IMAGING | Facility: CLINIC | Age: 41
End: 2021-09-10
Attending: INTERNAL MEDICINE
Payer: COMMERCIAL

## 2021-09-10 DIAGNOSIS — R00.2 PALPITATIONS: ICD-10-CM

## 2021-09-10 DIAGNOSIS — Z82.49 FAMILY HISTORY OF PREMATURE CAD: ICD-10-CM

## 2021-09-10 LAB — LVEF ECHO: NORMAL

## 2021-09-10 PROCEDURE — 75571 CT HRT W/O DYE W/CA TEST: CPT

## 2021-09-10 PROCEDURE — 93306 TTE W/DOPPLER COMPLETE: CPT

## 2021-09-10 PROCEDURE — 93306 TTE W/DOPPLER COMPLETE: CPT | Mod: 26 | Performed by: INTERNAL MEDICINE

## 2021-09-10 PROCEDURE — 75571 CT HRT W/O DYE W/CA TEST: CPT | Mod: 26 | Performed by: INTERNAL MEDICINE

## 2021-09-17 ENCOUNTER — TELEPHONE (OUTPATIENT)
Dept: CARDIOLOGY | Facility: CLINIC | Age: 41
End: 2021-09-17

## 2021-09-17 NOTE — TELEPHONE ENCOUNTER
Health Call Center    Phone Message    May a detailed message be left on voicemail: no     Reason for Call: Other: Ana called from [facility] to advise Dr John Jiang of a pre-auth denial for CPT code 19864 CT Scan with evaluation of blood vessel Calcium. The test was denied because their is not enough proof this test is medically necessary.  Please reach ou to Ana with any questions/concerns, she can be reached at [phone].    Action Taken: Message routed to:  [facility] ([facility] Cardiology    Travel Screening: Not Applicable

## 2021-09-20 NOTE — TELEPHONE ENCOUNTER
Spoke with Indu from [facility].     Advise that test is not cover under insurance.     Patient is to pay out of pocket.

## 2021-11-18 NOTE — PROGRESS NOTES
CARDIOLOGY Prevention Note    HPI:  Jitendra Flannery is a 40 year old male who self referred to Cardiology for discussion of risk factor modification in the setting of significant family history. He returns for follow up.     8/27/21  Jitendra is a  at the [employer].  He is very active with cycling, [employer].  He presents today in the setting of discussion of cardiovascular risk modification and assessment following his brother's unexpected death earlier this year.    Patient reports that his brother passed away in January after a cardiac arrest at home at the age of 50.  An autopsy was performed and showed significant coronary artery disease along with some cardiomegaly. His brother was previously healthy and quite active. Other family history includes a grandfather who had an MI at age 65. His mother had a diagnosis of pulmonary hypertension in her 60s or 70s of unclear etiology.    EKG in clinic shows sinus bradycardia with Q waves in V1 and possibly V2    11/19/21  Testing for his last clinic visit showed an LDL cholesterol of 126 mg/dL, LP(a) of 6, normal hemoglobin A1c, normal echocardiogram, and a coronary calcium scan of 0.  Stewart continues to feel well and be active.  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  We had a discussion about the risks and benefits of early treatment for lipid-lowering and the utility of additional diagnostic testing to evaluate for early signs of atherosclerotic disease.    ASSESSMENT AND PLAN  Jitendra Flannery is a 40 year old male presenting for discussion regarding cardiovascular risk assessment and risk modification.  So far he continues to be active without any concerning symptoms but his brother also felt the same way prior to his cardiac arrest, so Stewart understandably has concerns.  Biomarker testing has been pretty unremarkable apart from a mildly elevated LDL cholesterol.  Imaging  has also been normal acknowledging that a CAC scan would not  noncalcified atherosclerotic plaques.    We discussed the additional testing available through the [facility] and Stewart will consider this.  He would like to avoid starting a lipid-lowering medication now if it is not needed.    At this point Stewart will continue his healthy lifestyle with careful consideration for any symptoms he might experience, consider additional noninvasive testing, and hold off on lipid-lowering therapy at this time.    Recommendations  -Consider evaluation the [facility]  -Follow-up in preventive clinic with me in 3 years    Thank you for the opportunity to participate in the care of Jitendra. Please feel free to contact me with any additional questions or concerns.    Kartik Jiang MD    Preventive Cardiology  Pager: [phone]    The total time of this encounter amounted to 38 minutes. This time included time spent with the patient, reviewing records, ordering tests, and performing post visit documentation.     PAST MEDICAL HISTORY:  There is no problem list on file for this patient.    Past Medical History:   Diagnosis Date     NO ACTIVE PROBLEMS        CURRENT MEDICATIONS:  No current outpatient medications on file.       PAST SURGICAL HISTORY:  Past Surgical History:   Procedure Laterality Date     KNEE SURGERY      rt knee x for ACL REPAIER  ,         ALLERGIES  Patient has no known allergies.    FAMILY HX:  Family History   Problem Relation Age of Onset     Cirrhosis Mother          AT AGE 73, FROM COMPLICATION      Pulmonary Hypertension Mother      Coronary Artery Disease Maternal Grandfather 65     Myocardial Infarction Maternal Grandfather 65     Diabetes No family hx of      Cerebrovascular Disease No family hx of        SOCIAL HX:  Social History     Tobacco Use     Smoking status: Never Smoker     Smokeless tobacco: Never Used   Substance Use Topics     Alcohol use:  "Yes     Drug use: Never        ROS:  Comprehensive ROS is negative except as noted in HPI.    VITAL SIGNS:  /76 (BP Location: Right arm, Patient Position: Chair, Cuff Size: Adult Large)   Pulse 59   Ht 1.87 m (6' 1.62\")   Wt 95.7 kg (211 lb)   SpO2 98%   BMI 27.37 kg/m    Body mass index is 27.37 kg/m .  Wt Readings from Last 2 Encounters:   11/19/21 95.7 kg (211 lb)   08/27/21 96.6 kg (213 lb)       PHYSICAL EXAM  Gen: pleasant male sitting comfortably in NAD  CV: nml s1/s2, no murmur or gallop; no JVD  Chest: clear lung sounds bilaterally   Abd: Non distended   Ext: warm, no LE edema  Skin: no rash on limited exam  Neuro: awake, alert, oriented, nml speech and gait    LABS: personally reviewed  CMP  Recent Labs   Lab Test 08/27/21  1232 05/28/20  1435    138   POTASSIUM 4.3 4.0   CHLORIDE 107 107   CO2 30 25   ANIONGAP 3 6   GLC 92 96   BUN 9 11   CR 1.12 0.99   GFRESTIMATED 81 >90   GFRESTBLACK  --  >90   JONI 9.7 9.5   PROTTOTAL 7.6 8.0   ALBUMIN 4.3 4.4   BILITOTAL 0.5 0.7   ALKPHOS 55 53   AST 23 25   ALT 23 25     CBC  Recent Labs   Lab Test 08/27/21  1232 05/28/20  1435   WBC 5.2 7.9   RBC 5.43 5.47   HGB 15.5 15.8   HCT 46.9 46.2   MCV 86 85   MCH 28.5 28.9   MCHC 33.0 34.2   RDW 12.1 12.6    227     INRNo lab results found.  Recent Labs   Lab Test 08/27/21  1232   CHOL 198   HDL 54   *   TRIG 88     Recent Labs   Lab Test 08/27/21  1232   A1C 5.2       "

## 2021-11-19 ENCOUNTER — OFFICE VISIT (OUTPATIENT)
Dept: CARDIOLOGY | Facility: CLINIC | Age: 41
End: 2021-11-19
Attending: INTERNAL MEDICINE
Payer: COMMERCIAL

## 2021-11-19 VITALS
HEIGHT: 74 IN | DIASTOLIC BLOOD PRESSURE: 76 MMHG | SYSTOLIC BLOOD PRESSURE: 124 MMHG | WEIGHT: 211 LBS | HEART RATE: 59 BPM | BODY MASS INDEX: 27.08 KG/M2 | OXYGEN SATURATION: 98 %

## 2021-11-19 DIAGNOSIS — Z82.49 FAMILY HISTORY OF PREMATURE CAD: Primary | ICD-10-CM

## 2021-11-19 DIAGNOSIS — E78.5 HYPERLIPIDEMIA LDL GOAL <100: ICD-10-CM

## 2021-11-19 PROCEDURE — G0463 HOSPITAL OUTPT CLINIC VISIT: HCPCS

## 2021-11-19 PROCEDURE — 99214 OFFICE O/P EST MOD 30 MIN: CPT | Performed by: INTERNAL MEDICINE

## 2021-11-19 ASSESSMENT — ENCOUNTER SYMPTOMS
BOWEL INCONTINENCE: 0
VOMITING: 0
HEARTBURN: 0
RECTAL PAIN: 0
DIARRHEA: 1
CONSTIPATION: 1
JAUNDICE: 0
BLOOD IN STOOL: 0
NAUSEA: 0
BLOATING: 1
ABDOMINAL PAIN: 1

## 2021-11-19 ASSESSMENT — MIFFLIN-ST. JEOR: SCORE: 1920.84

## 2021-11-19 ASSESSMENT — PAIN SCALES - GENERAL: PAINLEVEL: NO PAIN (0)

## 2021-11-19 NOTE — PATIENT INSTRUCTIONS
"You were seen today in the Cardiovascular Clinic at the [facility].     Cardiology Providers you saw during your visit: Dr. John Jiang     Diagnosis:   Encounter Diagnosis   Name Primary?     Family history of premature CAD           Orders:   No orders of the defined types were placed in this encounter.      Current Medication List  No current outpatient medications on file.         Medications Discontinued:  There are no discontinued medications.      Recommendations:   1. Schedule a visit at the [facility] early CVD detection clinic.  2. Follow up with Dr. John Jiang in 3 years or sooner if any new questions or concerns come up.      Please feel free to call me with any questions or concerns.       Amrit Heredia LPN     Questions: [phone].   First press #1 for the [employer] and then press #4 for \"Medical Questions\" to reach us Cardiology Nurses.     [phone].   First press #1 for the [employer] and then press #1     On Call Cardiologist for after hours or on weekends: [phone]   option #4 and ask to speak to the on-call Cardiologist.          If you need a medication refill please contact your pharmacy.  Please allow 3 business days for your refill to be completed.  ________________________________________________________________________________________________________________________________         "

## 2021-11-19 NOTE — NURSING NOTE
Chief Complaint   Patient presents with     Follow Up     3 month f/u     Vitals were taken and medications reconciled.    Mati Khan, EMT  10:23 AM

## 2021-11-19 NOTE — LETTER
11/19/2021      RE: Jitendra Flannery  [id number] [address] [facility] [address]       Dear Colleague,    Thank you for the opportunity to participate in the care of your patient, Jitendra Flannery, at the [facility] [place] [facility]. Please see a copy of my visit note below.    CARDIOLOGY Prevention Note    HPI:  Jitendra Flannery is a 40 year old male who self referred to Cardiology for discussion of risk factor modification in the setting of significant family history. He returns for follow up.     8/27/21  Jitendra is a  at the [employer].  He is very active with cycling, [employer].  He presents today in the setting of discussion of cardiovascular risk modification and assessment following his brother's unexpected death earlier this year.    Patient reports that his brother passed away in January after a cardiac arrest at home at the age of 50.  An autopsy was performed and showed significant coronary artery disease along with some cardiomegaly. His brother was previously healthy and quite active. Other family history includes a grandfather who had an MI at age 65. His mother had a diagnosis of pulmonary hypertension in her 60s or 70s of unclear etiology.    EKG in clinic shows sinus bradycardia with Q waves in V1 and possibly V2    11/19/21  Testing for his last clinic visit showed an LDL cholesterol of 126 mg/dL, LP(a) of 6, normal hemoglobin A1c, normal echocardiogram, and a coronary calcium scan of 0.  Stewart continues to feel well and be active.  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  We had a discussion about the risks and benefits of early treatment for lipid-lowering and the utility of additional diagnostic testing to evaluate for early signs of atherosclerotic disease.    ASSESSMENT AND PLAN  Jitendra Flannery is a 40 year old male presenting for  discussion regarding cardiovascular risk assessment and risk modification.  So far he continues to be active without any concerning symptoms but his brother also felt the same way prior to his cardiac arrest, so Stewart understandably has concerns.  Biomarker testing has been pretty unremarkable apart from a mildly elevated LDL cholesterol.  Imaging has also been normal acknowledging that a CAC scan would not  noncalcified atherosclerotic plaques.    We discussed the additional testing available through the [facility] and Stewart will consider this.  He would like to avoid starting a lipid-lowering medication now if it is not needed.    At this point Stewart will continue his healthy lifestyle with careful consideration for any symptoms he might experience, consider additional noninvasive testing, and hold off on lipid-lowering therapy at this time.    Recommendations  -Consider evaluation the [facility]  -Follow-up in preventive clinic with me in 3 years    Thank you for the opportunity to participate in the care of Jitendra. Please feel free to contact me with any additional questions or concerns.    Kartik Jiang MD    Preventive Cardiology  Pager: [phone]    The total time of this encounter amounted to 38 minutes. This time included time spent with the patient, reviewing records, ordering tests, and performing post visit documentation.     PAST MEDICAL HISTORY:  There is no problem list on file for this patient.    Past Medical History:   Diagnosis Date     NO ACTIVE PROBLEMS        CURRENT MEDICATIONS:  No current outpatient medications on file.       PAST SURGICAL HISTORY:  Past Surgical History:   Procedure Laterality Date     KNEE SURGERY      rt knee x for ACL REPAIER  ,         ALLERGIES  Patient has no known allergies.    FAMILY HX:  Family History   Problem Relation Age of Onset     Cirrhosis Mother          AT AGE 73, FROM COMPLICATION      Pulmonary  "Hypertension Mother      Coronary Artery Disease Maternal Grandfather 65     Myocardial Infarction Maternal Grandfather 65     Diabetes No family hx of      Cerebrovascular Disease No family hx of        SOCIAL HX:  Social History     Tobacco Use     Smoking status: Never Smoker     Smokeless tobacco: Never Used   Substance Use Topics     Alcohol use: Yes     Drug use: Never        ROS:  Comprehensive ROS is negative except as noted in HPI.    VITAL SIGNS:  /76 (BP Location: Right arm, Patient Position: Chair, Cuff Size: Adult Large)   Pulse 59   Ht 1.87 m (6' 1.62\")   Wt 95.7 kg (211 lb)   SpO2 98%   BMI 27.37 kg/m    Body mass index is 27.37 kg/m .  Wt Readings from Last 2 Encounters:   11/19/21 95.7 kg (211 lb)   08/27/21 96.6 kg (213 lb)       PHYSICAL EXAM  Gen: pleasant male sitting comfortably in NAD  CV: nml s1/s2, no murmur or gallop; no JVD  Chest: clear lung sounds bilaterally   Abd: Non distended   Ext: warm, no LE edema  Skin: no rash on limited exam  Neuro: awake, alert, oriented, nml speech and gait    LABS: personally reviewed  CMP  Recent Labs   Lab Test 08/27/21  1232 05/28/20  1435    138   POTASSIUM 4.3 4.0   CHLORIDE 107 107   CO2 30 25   ANIONGAP 3 6   GLC 92 96   BUN 9 11   CR 1.12 0.99   GFRESTIMATED 81 >90   GFRESTBLACK  --  >90   JONI 9.7 9.5   PROTTOTAL 7.6 8.0   ALBUMIN 4.3 4.4   BILITOTAL 0.5 0.7   ALKPHOS 55 53   AST 23 25   ALT 23 25     CBC  Recent Labs   Lab Test 08/27/21  1232 05/28/20  1435   WBC 5.2 7.9   RBC 5.43 5.47   HGB 15.5 15.8   HCT 46.9 46.2   MCV 86 85   MCH 28.5 28.9   MCHC 33.0 34.2   RDW 12.1 12.6    227     INRNo lab results found.  Recent Labs   Lab Test 08/27/21  1232   CHOL 198   HDL 54   *   TRIG 88     Recent Labs   Lab Test 08/27/21  1232   A1C 5.2           Please do not hesitate to contact me if you have any questions/concerns.     Sincerely,     Kartik Bolden'saundra Jiang MD    "

## 2022-04-18 ENCOUNTER — APPOINTMENT (OUTPATIENT)
Dept: GENERAL RADIOLOGY | Facility: CLINIC | Age: 42
End: 2022-04-18
Attending: EMERGENCY MEDICINE
Payer: COMMERCIAL

## 2022-04-18 ENCOUNTER — HOSPITAL ENCOUNTER (EMERGENCY)
Facility: CLINIC | Age: 42
Discharge: HOME | End: 2022-04-18
Attending: EMERGENCY MEDICINE | Admitting: EMERGENCY MEDICINE
Payer: COMMERCIAL

## 2022-04-18 VITALS
TEMPERATURE: 98 F | DIASTOLIC BLOOD PRESSURE: 77 MMHG | HEART RATE: 56 BPM | SYSTOLIC BLOOD PRESSURE: 135 MMHG | OXYGEN SATURATION: 97 % | RESPIRATION RATE: 16 BRPM | HEIGHT: 73 IN | WEIGHT: 205 LBS | BODY MASS INDEX: 27.17 KG/M2

## 2022-04-18 DIAGNOSIS — M79.602 PAIN OF LEFT UPPER EXTREMITY: ICD-10-CM

## 2022-04-18 DIAGNOSIS — R07.9 CHEST PAIN, UNSPECIFIED TYPE: ICD-10-CM

## 2022-04-18 LAB
ANION GAP SERPL CALCULATED.3IONS-SCNC: 4 MMOL/L (ref 3–14)
ATRIAL RATE - MUSE: 64 BPM
BASOPHILS # BLD AUTO: 0 10E3/UL (ref 0–0.2)
BASOPHILS NFR BLD AUTO: 1 %
BUN SERPL-MCNC: 12 MG/DL (ref 7–30)
CALCIUM SERPL-MCNC: 9.1 MG/DL (ref 8.5–10.1)
CHLORIDE BLD-SCNC: 105 MMOL/L (ref 94–109)
CO2 SERPL-SCNC: 29 MMOL/L (ref 20–32)
CREAT SERPL-MCNC: 1.04 MG/DL (ref 0.66–1.25)
DIASTOLIC BLOOD PRESSURE - MUSE: NORMAL MMHG
EOSINOPHIL # BLD AUTO: 0.2 10E3/UL (ref 0–0.7)
EOSINOPHIL NFR BLD AUTO: 3 %
ERYTHROCYTE [DISTWIDTH] IN BLOOD BY AUTOMATED COUNT: 12.1 % (ref 10–15)
GFR SERPL CREATININE-BSD FRML MDRD: >90 ML/MIN/1.73M2
GLUCOSE BLD-MCNC: 96 MG/DL (ref 70–99)
HCT VFR BLD AUTO: 48.8 % (ref 40–53)
HGB BLD-MCNC: 15.6 G/DL (ref 13.3–17.7)
HOLD SPECIMEN: NORMAL
IMM GRANULOCYTES # BLD: 0 10E3/UL
IMM GRANULOCYTES NFR BLD: 0 %
INTERPRETATION ECG - MUSE: NORMAL
LYMPHOCYTES # BLD AUTO: 2.7 10E3/UL (ref 0.8–5.3)
LYMPHOCYTES NFR BLD AUTO: 36 %
MCH RBC QN AUTO: 28.2 PG (ref 26.5–33)
MCHC RBC AUTO-ENTMCNC: 32 G/DL (ref 31.5–36.5)
MCV RBC AUTO: 88 FL (ref 78–100)
MONOCYTES # BLD AUTO: 0.7 10E3/UL (ref 0–1.3)
MONOCYTES NFR BLD AUTO: 9 %
NEUTROPHILS # BLD AUTO: 4 10E3/UL (ref 1.6–8.3)
NEUTROPHILS NFR BLD AUTO: 51 %
NRBC # BLD AUTO: 0 10E3/UL
NRBC BLD AUTO-RTO: 0 /100
P AXIS - MUSE: 67 DEGREES
PLATELET # BLD AUTO: 246 10E3/UL (ref 150–450)
POTASSIUM BLD-SCNC: 4 MMOL/L (ref 3.4–5.3)
PR INTERVAL - MUSE: 204 MS
QRS DURATION - MUSE: 92 MS
QT - MUSE: 384 MS
QTC - MUSE: 396 MS
R AXIS - MUSE: 66 DEGREES
RBC # BLD AUTO: 5.53 10E6/UL (ref 4.4–5.9)
SODIUM SERPL-SCNC: 138 MMOL/L (ref 133–144)
SYSTOLIC BLOOD PRESSURE - MUSE: NORMAL MMHG
T AXIS - MUSE: 64 DEGREES
TROPONIN I SERPL HS-MCNC: 3 NG/L
TROPONIN I SERPL HS-MCNC: <3 NG/L
VENTRICULAR RATE- MUSE: 64 BPM
WBC # BLD AUTO: 7.7 10E3/UL (ref 4–11)

## 2022-04-18 PROCEDURE — 80048 BASIC METABOLIC PNL TOTAL CA: CPT | Performed by: EMERGENCY MEDICINE

## 2022-04-18 PROCEDURE — 84484 ASSAY OF TROPONIN QUANT: CPT | Performed by: EMERGENCY MEDICINE

## 2022-04-18 PROCEDURE — 93005 ELECTROCARDIOGRAM TRACING: CPT

## 2022-04-18 PROCEDURE — 85004 AUTOMATED DIFF WBC COUNT: CPT | Performed by: EMERGENCY MEDICINE

## 2022-04-18 PROCEDURE — 99285 EMERGENCY DEPT VISIT HI MDM: CPT | Mod: 25

## 2022-04-18 PROCEDURE — 36415 COLL VENOUS BLD VENIPUNCTURE: CPT | Performed by: EMERGENCY MEDICINE

## 2022-04-18 PROCEDURE — 71046 X-RAY EXAM CHEST 2 VIEWS: CPT

## 2022-04-18 PROCEDURE — 93005 ELECTROCARDIOGRAM TRACING: CPT | Mod: 76

## 2022-04-18 PROCEDURE — 84484 ASSAY OF TROPONIN QUANT: CPT | Mod: 91 | Performed by: EMERGENCY MEDICINE

## 2022-04-18 PROCEDURE — 85025 COMPLETE CBC W/AUTO DIFF WBC: CPT | Performed by: EMERGENCY MEDICINE

## 2022-04-19 LAB
ATRIAL RATE - MUSE: 59 BPM
DIASTOLIC BLOOD PRESSURE - MUSE: NORMAL MMHG
INTERPRETATION ECG - MUSE: NORMAL
P AXIS - MUSE: 60 DEGREES
PR INTERVAL - MUSE: 220 MS
QRS DURATION - MUSE: 88 MS
QT - MUSE: 394 MS
QTC - MUSE: 390 MS
R AXIS - MUSE: 64 DEGREES
SYSTOLIC BLOOD PRESSURE - MUSE: NORMAL MMHG
T AXIS - MUSE: 56 DEGREES
VENTRICULAR RATE- MUSE: 59 BPM

## 2022-04-19 NOTE — ED NOTES
Bed: ED10  Expected date:   Expected time:   Means of arrival:   Comments:  Hold for chest pain - Trevor Flannery

## 2022-04-19 NOTE — ED TRIAGE NOTES
Pt here tonight with left arm pain and chest pain. Reports started 1.5 hours ago. Denies cardiac hx thought states his brother  of a MI last year at 51. Pt denies any SOB, nausea, diaphoresis. Pt does report slight lightheadedness.  Pt A&Ox4

## 2022-04-19 NOTE — ED PROVIDER NOTES
History     Chief Complaint:  Chest Pain       HPI   Jitendra Flannery is a 41 year old male who presents with left-sided chest discomfort and left arm discomfort.  About 2 hours ago, patient developed sharp pain about the left shoulder that radiates to his lateral left mid forearm.  No numbness or weakness in the left arm, no trauma, no neck pain.  Shortly after he developed very subtle left lower chest wall sharp discomfort.  No modifying factors.  Patient had calcium score of 0 on CT CA from September 2021.  His brother did pass from MI around 50 years of age.  Patient denies lower extremity pain or swelling.  Patient denies immobilization for >3 days or surgery within the previous 4 weeks, history of DVT or PE, hemoptysis, malignancy with treatment within previous 6 months or palliative therapy, or exogenous estrogen use.     ROS:  Review of Systems  A 10 point ROS was obtained and negative except as noted here and in HPI      Allergies:  No Known Allergies     Medications:    No current outpatient medications on file.      Past Medical History:    Past Medical History:   Diagnosis Date     NO ACTIVE PROBLEMS      There is no problem list on file for this patient.       Past Surgical History:    Past Surgical History:   Procedure Laterality Date     KNEE SURGERY      rt knee x for ACL REPAIER  1997, 2001         Family History:    family history includes Cirrhosis in his mother; Coronary Artery Disease (age of onset: 65) in his maternal grandfather; Myocardial Infarction (age of onset: 65) in his maternal grandfather; Pulmonary Hypertension in his mother.    Social History:   reports that he has never smoked. He has never used smokeless tobacco. He reports current alcohol use. He reports that he does not use drugs.  PCP: [facility]     Physical Exam     Patient Vitals for the past 24 hrs:   BP Temp Temp src Pulse Resp SpO2 Height Weight   04/18/22 2315 135/77 -- -- 56 -- 97 % -- --   04/18/22  "2300 134/78 -- -- 54 -- 97 % -- --   04/18/22 2245 134/76 -- -- 61 -- 98 % -- --   04/18/22 2200 137/84 -- -- 61 -- 98 % -- --   04/18/22 2005 (!) 162/96 98  F (36.7  C) Temporal 67 16 100 % 1.854 m (6' 1\") 93 kg (205 lb)        Physical Exam  VS: Reviewed per above  HENT: normal speech  EYES: sclera anicteric  CV: Rate as noted, regular rhythm.   RESP: Effort normal. Breath sounds are normal bilaterally.  NEURO: Alert, moving all extremities  MSK: No deformity of the extremities, no calf ttp.  Intact left radial pulse.  Left upper extremity is warm and well-perfused.  No joint effusions.  No pain with passive range of motion of the joints of the left upper extremity.  Left upper extremity compartments are soft.  SKIN: Warm and dry    Emergency Department Course   ECG  ECG taken at 2006, ECG read at 2010  NSR  Septal infarct, age undetermined   No significant change as compared to prior, dated 09/27/21.  Rate 64 bpm. SC interval 204 ms. QRS duration 92 ms. QT/QTc 384/396 ms. P-R-T axes 67 66 64.     ECG #2  ECG taken at 2159, ECG read at 2200  Sinus bradycardia with 1st degree AV block  Septal infarct, age undetermined   No significant change as compared to prior, dated 04/18/22 @ 2006.  Rate 59bpm. SC interval 220 ms. QRS duration 88 ms. QT/QTc 394/390 ms. P-R-T axes 60 64 56.     Imaging:  XR Chest 2 Views   Final Result   IMPRESSION: Negative chest.         Report per radiology    Laboratory:  Labs Ordered and Resulted from Time of ED Arrival to Time of ED Departure   BASIC METABOLIC PANEL - Normal       Result Value    Sodium 138      Potassium 4.0      Chloride 105      Carbon Dioxide (CO2) 29      Anion Gap 4      Urea Nitrogen 12      Creatinine 1.04      Calcium 9.1      Glucose 96      GFR Estimate >90     TROPONIN I - Normal    Troponin I High Sensitivity <3     TROPONIN I - Normal    Troponin I High Sensitivity 3     CBC WITH PLATELETS AND DIFFERENTIAL    WBC Count 7.7      RBC Count 5.53      Hemoglobin " 15.6      Hematocrit 48.8      MCV 88      MCH 28.2      MCHC 32.0      RDW 12.1      Platelet Count 246      % Neutrophils 51      % Lymphocytes 36      % Monocytes 9      % Eosinophils 3      % Basophils 1      % Immature Granulocytes 0      NRBCs per 100 WBC 0      Absolute Neutrophils 4.0      Absolute Lymphocytes 2.7      Absolute Monocytes 0.7      Absolute Eosinophils 0.2      Absolute Basophils 0.0      Absolute Immature Granulocytes 0.0      Absolute NRBCs 0.0          Emergency Department Course:             Reviewed:  I reviewed nursing notes, vitals and past medical history    Assessments:   I obtained history and examined the patient as noted above.    I rechecked the patient and explained findings.       Interventions:  Medications - No data to display     Disposition:  The patient was discharged to home.     Impression & Plan        Medical Decision Making:  Jtiendra Flannery presented to the ER with chest pain.  CXR did not reveal wide mediastinum and nature of pain not c/w aortic dissection. No radiographic evidence of pneumothorax nor PNA either. Hx and lack of pneumomediastinum on CXR make boerhaave's syndrome unlikely. Based on lower pretest probability and PERC criteria, PE was also deemed unlikely.  Patient declined D-dimer testing, citing lack of respiratory symptoms or other concerns for PE.  ECG did not show signs of STEMI nor other specific signs of ischemia. Serial troponin testing was negative, thus no signs of acute MI. Hx is also not consistent with unstable angina. Nature of pain in conjunction with reassuring ECG and negative troponin makes pericarditis and myocarditis unlikely as well.  Patient had a calcium score of 0 on CTCA 1 year ago.  Overall suspicion for coronary artery disease is quite low.  With respect to left upper extremity discomfort, symptoms seem consistent with possible radiculopathy or nerve irritation.  No signs of compartment syndrome, joint effusion, skin or soft  tissue infection.  Encouraged close primary care follow up.  Return precautions discussed prior to discharge.      Diagnosis:    ICD-10-CM    1. Chest pain, unspecified type  R07.9    2. Pain of left upper extremity  M79.602         Discharge Medications:  There are no discharge medications for this patient.       4/18/2022   Sandoval Welch MD Lindenbaum, Elan, MD  04/19/22 0225

## 2022-10-22 ENCOUNTER — HEALTH MAINTENANCE LETTER (OUTPATIENT)
Age: 42
End: 2022-10-22

## 2023-11-05 ENCOUNTER — HEALTH MAINTENANCE LETTER (OUTPATIENT)
Age: 43
End: 2023-11-05

## 2024-12-22 ENCOUNTER — HEALTH MAINTENANCE LETTER (OUTPATIENT)
Age: 44
End: 2024-12-22